# Patient Record
Sex: MALE | Race: WHITE | URBAN - METROPOLITAN AREA
[De-identification: names, ages, dates, MRNs, and addresses within clinical notes are randomized per-mention and may not be internally consistent; named-entity substitution may affect disease eponyms.]

---

## 2021-02-15 ENCOUNTER — OFFICE VISIT (OUTPATIENT)
Dept: URBAN - METROPOLITAN AREA CLINIC 17 | Facility: CLINIC | Age: 51
End: 2021-02-15
Payer: COMMERCIAL

## 2021-02-15 PROCEDURE — 99204 OFFICE O/P NEW MOD 45 MIN: CPT | Performed by: OPHTHALMOLOGY

## 2021-02-15 ASSESSMENT — KERATOMETRY
OS: 45.13
OD: 43.50

## 2021-02-15 ASSESSMENT — INTRAOCULAR PRESSURE
OD: 18
OS: 16

## 2021-02-15 ASSESSMENT — VISUAL ACUITY
OS: 20/100
OD: 20/60

## 2021-02-15 NOTE — IMPRESSION/PLAN
Impression: Type 2 diab w moderate nonprlf diab rtnop w/o macular edema, bilateral: A76.1898. Plan: Diabetes type II: moderate background diabetic retinopathy, no signs of neovascularization noted. Will refer patient for a retinal consult to determine if treatment is necessary. Discussed ocular and systemic benefits of maintaining blood sugar control.

## 2021-02-15 NOTE — IMPRESSION/PLAN
Impression: Combined forms of age-related cataract, bilateral: H25.813. Condition: established, worsening. Symptoms: could improve with surgery. Plan: Cataract accounts for patient's complaints. Reviewed risks, benefits, and procedure. Patient desires surgery, schedule ce/iol OD then OS, RL2, standard lens, distance refractive target, patient is clear for surgery in [place] [id number]. Pt to use NSAID and Steroids (Or Combo drops) for 6-8 weeks after surgery. Advised pt visual improvement may be limited due to retinopathy. Pt aware and would like to proceed.

## 2021-02-18 ENCOUNTER — OFFICE VISIT (OUTPATIENT)
Dept: URBAN - METROPOLITAN AREA CLINIC 33 | Facility: CLINIC | Age: 51
End: 2021-02-18
Payer: COMMERCIAL

## 2021-02-18 DIAGNOSIS — E11.3393 TYPE 2 DIAB W MODERATE NONPRLF DIAB RTNOP W/O MACULAR EDEMA, BILATERAL: Primary | ICD-10-CM

## 2021-02-18 DIAGNOSIS — H25.813 COMBINED FORMS OF AGE-RELATED CATARACT, BILATERAL: ICD-10-CM

## 2021-02-18 PROCEDURE — 99214 OFFICE O/P EST MOD 30 MIN: CPT | Performed by: OPHTHALMOLOGY

## 2021-02-18 PROCEDURE — 92134 CPTRZ OPH DX IMG PST SGM RTA: CPT | Performed by: OPHTHALMOLOGY

## 2021-02-18 ASSESSMENT — INTRAOCULAR PRESSURE
OS: 17
OD: 18

## 2021-02-18 NOTE — IMPRESSION/PLAN
Impression: Type 2 diab w moderate nonprlf diab rtnop w/o macular edema, bilateral: Z95.0475. Bilateral. Condition: stable. Vision: vision not affected. no prior treatment Plan: Discussed diagnosis in detail with patient. Exam shows  scattered hemorrhage, no neovascularization, no macular edema OU, slightly hazy view due to dense cataracts. No treatment is recommended at this time. Emphasized blood sugar control and advised to keep future appointments with PCP and/or Endocrinologist for the management of Diabetes. Recommend observation for now. OCT shows stable no active macular edema OU.

## 2021-02-18 NOTE — IMPRESSION/PLAN
Impression: Combined forms of age-related cataract, bilateral: H25.813. Bilateral. Condition: worsening. Vision: vision affected. Plan: Advised patient of condition. Discussed diagnosis in detail with patient. Discussed with patient he is okay to proceed with cataract surgery OU, with Dr. Kay Engel.

## 2021-02-19 ENCOUNTER — PRE-OPERATIVE VISIT (OUTPATIENT)
Dept: URBAN - METROPOLITAN AREA CLINIC 17 | Facility: CLINIC | Age: 51
End: 2021-02-19
Payer: COMMERCIAL

## 2021-02-19 RX ORDER — OFLOXACIN 3 MG/ML
0.3 % SOLUTION/ DROPS OPHTHALMIC
Qty: 5 | Refills: 1 | Status: INACTIVE
Start: 2021-02-19 | End: 2021-04-14

## 2021-02-19 RX ORDER — KETOROLAC TROMETHAMINE 5 MG/ML
0.5 % SOLUTION OPHTHALMIC
Qty: 5 | Refills: 1 | Status: INACTIVE
Start: 2021-02-19 | End: 2021-04-14

## 2021-02-19 RX ORDER — PREDNISOLONE ACETATE 10 MG/ML
1 % SUSPENSION/ DROPS OPHTHALMIC
Qty: 10 | Refills: 1 | Status: INACTIVE
Start: 2021-02-19 | End: 2021-04-14

## 2021-02-19 ASSESSMENT — PACHYMETRY
OD: 3.50
OS: 23.57
OD: 23.71
OS: 3.45

## 2021-02-25 ENCOUNTER — SURGERY (OUTPATIENT)
Dept: URBAN - METROPOLITAN AREA SURGERY 7 | Facility: SURGERY | Age: 51
End: 2021-02-25
Payer: COMMERCIAL

## 2021-02-25 PROCEDURE — 66984 XCAPSL CTRC RMVL W/O ECP: CPT | Performed by: OPHTHALMOLOGY

## 2021-02-26 ENCOUNTER — POST-OPERATIVE VISIT (OUTPATIENT)
Dept: URBAN - METROPOLITAN AREA CLINIC 17 | Facility: CLINIC | Age: 51
End: 2021-02-26
Payer: COMMERCIAL

## 2021-02-26 PROCEDURE — 99024 POSTOP FOLLOW-UP VISIT: CPT | Performed by: OPTOMETRIST

## 2021-02-26 ASSESSMENT — INTRAOCULAR PRESSURE
OS: 15
OD: 20

## 2021-02-26 NOTE — IMPRESSION/PLAN
Impression: S/P Cataract Extraction by phacoemulsification with IOL placement [id number] OD - 1 Day. Encounter for surgical aftercare following surgery on a sense organ  Z48.810. Post operative instructions reviewed - Plan: RTC in 1 week for PO2 OD:
--Start Ketorolac QID x 8 weeks
----Continue Ofloxacin 0.3% QID x 1 week then d/c
--Taper Prednisolone acetate 1% QID x 5 wk, TID x 1 wk, BID x 1wk, QD x 1wk, then d/c

## 2021-03-05 ENCOUNTER — POST-OPERATIVE VISIT (OUTPATIENT)
Dept: URBAN - METROPOLITAN AREA CLINIC 17 | Facility: CLINIC | Age: 51
End: 2021-03-05
Payer: COMMERCIAL

## 2021-03-05 PROCEDURE — 99024 POSTOP FOLLOW-UP VISIT: CPT | Performed by: OPTOMETRIST

## 2021-03-05 ASSESSMENT — INTRAOCULAR PRESSURE
OS: 14
OD: 16

## 2021-03-05 NOTE — IMPRESSION/PLAN
Impression: S/P CE/Standard IOL SA60WF+ 19.50 OD - 8 Days. Encounter for surgical aftercare following surgery on a sense organ  Z48.810. Plan: --Finish Ofloxacin 0.3%--Taper Prednisolone acetate 1% QID x 1 wk, TID x 1 wk, BID x 1wk, QD x 1wk, then d/c. Cont.  Ketorolac QID

## 2021-03-15 ENCOUNTER — SURGERY (OUTPATIENT)
Dept: URBAN - METROPOLITAN AREA SURGERY 7 | Facility: SURGERY | Age: 51
End: 2021-03-15
Payer: COMMERCIAL

## 2021-03-15 DIAGNOSIS — H25.812 COMBINED FORMS OF AGE-RELATED CATARACT, LEFT EYE: Primary | ICD-10-CM

## 2021-03-15 PROCEDURE — 66984 XCAPSL CTRC RMVL W/O ECP: CPT | Performed by: OPHTHALMOLOGY

## 2021-03-16 ENCOUNTER — POST-OPERATIVE VISIT (OUTPATIENT)
Dept: URBAN - METROPOLITAN AREA CLINIC 17 | Facility: CLINIC | Age: 51
End: 2021-03-16
Payer: COMMERCIAL

## 2021-03-16 PROCEDURE — 99024 POSTOP FOLLOW-UP VISIT: CPT | Performed by: OPTOMETRIST

## 2021-03-16 ASSESSMENT — INTRAOCULAR PRESSURE
OS: 17
OD: 15

## 2021-03-16 NOTE — IMPRESSION/PLAN
Impression: S/P CE/Standard IOL (SA60WF, +19.50)  - 1 Day. Presence of intraocular lens  Z96.1. Post operative instructions reviewed - Plan: RTC in 1 week for PO2 OS:
--Continue Ofloxacin 0.3% QID x 1 week then d/c
--Continue Ketorolac QID x 8 weeks --Taper Prednisolone acetate 1% QID x 5 wk, TID x 1 wk, BID x 1wk, QD x 1wk, then d/c
OD:
--Taper Prednisolone acetate 1% QID x 3 wk, TID x 1 wk, BID x 1wk, QD x 1wk, then d/c
--Continue Ketorolac QID x 6 weeks

## 2021-03-22 ENCOUNTER — POST-OPERATIVE VISIT (OUTPATIENT)
Dept: URBAN - METROPOLITAN AREA CLINIC 17 | Facility: CLINIC | Age: 51
End: 2021-03-22
Payer: COMMERCIAL

## 2021-03-22 DIAGNOSIS — Z96.1 PRESENCE OF INTRAOCULAR LENS: Primary | ICD-10-CM

## 2021-03-22 PROCEDURE — 99024 POSTOP FOLLOW-UP VISIT: CPT | Performed by: OPHTHALMOLOGY

## 2021-03-22 ASSESSMENT — INTRAOCULAR PRESSURE
OD: 18
OS: 16

## 2021-03-22 ASSESSMENT — VISUAL ACUITY
OS: 20/30
OD: 20/25

## 2021-03-22 NOTE — IMPRESSION/PLAN
Impression: S/P CE/Standard IOL (SA60WF, +19.50) OS - 7 Days. Presence of intraocular lens  Z96.1. Plan: Advised pt he will not notices much improvement until he gets his first pair of eye glasses due to astigmatism.  --Continue Prednisolone acetate 1% QID OS and Ketorolac QID OS--Discontinue Ofloxacin 0.3%

## 2021-04-14 ENCOUNTER — OFFICE VISIT (OUTPATIENT)
Dept: URBAN - METROPOLITAN AREA CLINIC 17 | Facility: CLINIC | Age: 51
End: 2021-04-14
Payer: COMMERCIAL

## 2021-04-14 DIAGNOSIS — H26.493 OTHER SECONDARY CATARACT, BILATERAL: ICD-10-CM

## 2021-04-14 PROCEDURE — 99214 OFFICE O/P EST MOD 30 MIN: CPT | Performed by: OPHTHALMOLOGY

## 2021-04-14 PROCEDURE — 92134 CPTRZ OPH DX IMG PST SGM RTA: CPT | Performed by: OPHTHALMOLOGY

## 2021-04-14 ASSESSMENT — INTRAOCULAR PRESSURE
OS: 13
OD: 20

## 2021-04-14 NOTE — IMPRESSION/PLAN
Impression: Type 2 diab w moderate nonprlf diab rtnop w/o macular edema, bilateral: V32.2830. Bilateral. Condition: stable. Vision: vision not affected. no prior treatment Plan: Discussed diagnosis in detail with patient. Exam shows minimal diabetic changes OU. No treatment is recommended at this time. Emphasized blood sugar control and advised to keep future appointments with PCP and/or Endocrinologist for the management of Diabetes. Recommend observation for now. OCT is wnl OU and Optos shows ma's OD and stable OS. Recommend a retina follow - up in 9 - 12 mos.

## 2021-04-14 NOTE — IMPRESSION/PLAN
Impression: Other secondary cataract, bilateral: H26.493. Bilateral. Condition: worsening. Vision: vision affected. Plan: Discussed diagnosis in detail with patient. Recommend Yag Laser treatment Right eye and Left eye for vision improvement. Discussed risks/benefits of laser [place]. All questions answered. Patient elects to proceed with recommendation. RL1.

## 2021-04-20 ENCOUNTER — POST-OPERATIVE VISIT (OUTPATIENT)
Dept: URBAN - METROPOLITAN AREA CLINIC 17 | Facility: CLINIC | Age: 51
End: 2021-04-20
Payer: COMMERCIAL

## 2021-04-20 PROCEDURE — 99024 POSTOP FOLLOW-UP VISIT: CPT | Performed by: OPTOMETRIST

## 2021-04-20 ASSESSMENT — VISUAL ACUITY
OD: 20/25-1
OS: 20/25

## 2021-04-20 ASSESSMENT — INTRAOCULAR PRESSURE
OS: 11
OD: 13

## 2021-04-20 NOTE — IMPRESSION/PLAN
Impression: S/P CE/Standard IOL (SA60WF, +19.50) OS - 36 Days. Presence of intraocular lens  Z96.1.  Plan: Keep scheduled appt for YAG OU 
--Continue Prednisolone acetate 1% QID OS
--Continue Ketorolac 0.5% QID OS

## 2021-04-26 ENCOUNTER — SURGERY (OUTPATIENT)
Dept: URBAN - METROPOLITAN AREA SURGERY 7 | Facility: SURGERY | Age: 51
End: 2021-04-26
Payer: COMMERCIAL

## 2021-04-26 PROCEDURE — 66821 AFTER CATARACT LASER SURGERY: CPT | Performed by: OPHTHALMOLOGY

## 2021-05-03 ENCOUNTER — POST-OPERATIVE VISIT (OUTPATIENT)
Dept: URBAN - METROPOLITAN AREA CLINIC 17 | Facility: CLINIC | Age: 51
End: 2021-05-03
Payer: COMMERCIAL

## 2021-05-03 DIAGNOSIS — Z48.810 ENCOUNTER FOR SURGICAL AFTERCARE FOLLOWING SURGERY ON A SENSE ORGAN: Primary | ICD-10-CM

## 2021-05-03 PROCEDURE — 99024 POSTOP FOLLOW-UP VISIT: CPT | Performed by: OPTOMETRIST

## 2021-05-03 ASSESSMENT — INTRAOCULAR PRESSURE
OD: 13
OS: 10

## 2021-05-03 ASSESSMENT — VISUAL ACUITY: OD: 20/20

## 2021-05-03 NOTE — IMPRESSION/PLAN
Impression: S/P YAG Capsulotomy (Yttrium Aluminum [place]) OD - 7 Days. Encounter for surgical aftercare following surgery on a sense organ  Z48.810.  Plan: proceed with YAG OS as scheduled CONTINUE PRED/KETOROLAC BID OU

## 2021-05-18 ENCOUNTER — SURGERY (OUTPATIENT)
Dept: URBAN - METROPOLITAN AREA SURGERY 7 | Facility: SURGERY | Age: 51
End: 2021-05-18
Payer: COMMERCIAL

## 2021-05-18 DIAGNOSIS — H26.492 OTHER SECONDARY CATARACT, LEFT EYE: Primary | ICD-10-CM

## 2021-05-18 PROCEDURE — 66821 AFTER CATARACT LASER SURGERY: CPT | Performed by: OPHTHALMOLOGY

## 2021-05-24 ENCOUNTER — POST-OPERATIVE VISIT (OUTPATIENT)
Dept: URBAN - METROPOLITAN AREA CLINIC 17 | Facility: CLINIC | Age: 51
End: 2021-05-24
Payer: COMMERCIAL

## 2021-05-24 PROCEDURE — 99024 POSTOP FOLLOW-UP VISIT: CPT | Performed by: OPTOMETRIST

## 2021-05-24 ASSESSMENT — INTRAOCULAR PRESSURE
OD: 17
OS: 15

## 2021-05-24 NOTE — IMPRESSION/PLAN
Impression: S/P YAG Capsulotomy (Yttrium Aluminum [place]) OS - 6 Days. Encounter for surgical aftercare following surgery on a sense organ  Z48.810. Plan: 9-10 months with Dr Magalis Lang No Gtts--Advised patient to use artificial tears for comfort.

## 2022-12-21 ENCOUNTER — OFFICE VISIT (OUTPATIENT)
Dept: URBAN - METROPOLITAN AREA CLINIC 17 | Facility: CLINIC | Age: 52
End: 2022-12-21
Payer: COMMERCIAL

## 2022-12-21 DIAGNOSIS — E11.3312 TYPE 2 DIAB W MODERATE NONPRLF DIAB RTNOP W MACULAR EDEMA, LEFT EYE: Primary | ICD-10-CM

## 2022-12-21 DIAGNOSIS — E11.3291 TYPE 2 DIAB W MILD NONPRLF DIABETIC RTNOP W/O MACULAR EDEMA, RIGHT EYE: ICD-10-CM

## 2022-12-21 PROCEDURE — 92134 CPTRZ OPH DX IMG PST SGM RTA: CPT | Performed by: OPHTHALMOLOGY

## 2022-12-21 PROCEDURE — 99213 OFFICE O/P EST LOW 20 MIN: CPT | Performed by: OPHTHALMOLOGY

## 2022-12-21 RX ORDER — KETOROLAC TROMETHAMINE 5 MG/ML
0.5 % SOLUTION OPHTHALMIC
Qty: 10 | Refills: 5 | Status: ACTIVE
Start: 2022-12-21

## 2022-12-21 ASSESSMENT — INTRAOCULAR PRESSURE
OD: 18
OS: 18

## 2022-12-21 NOTE — IMPRESSION/PLAN
Impression: Type 2 diab w moderate nonprlf diab rtnop w macular edema, left eye: N20.8260 Left. Condition: established, worsening. Vision: vision affected. Plan: Discussed diagnosis in detail with patient. OCT OS shows active edema. Exam and Optos OS shows aneurysms. Recommend for patient to start Ketorolac QID OS and will reassess in 6-8 weeks. If no improvement with Drops, will consider an injection to further help reduce the edema.

## 2022-12-21 NOTE — IMPRESSION/PLAN
Impression: Type 2 diab w mild nonprlf diabetic rtnop w/o macular edema, right eye: e11.3291. Right. Condition: established, stable. Plan: Discussed diagnosis in detail with patient. Exam shows minimal Diabetic changes. No treatment is recommended at this time. Emphasized blood sugar control and advised to keep future appointments with PCP and/or Endocrinologist for the management of Diabetes. Recommend observation for now. OCT and Optos shows stable appearing.

## 2024-02-06 ENCOUNTER — OFFICE VISIT (OUTPATIENT)
Dept: URBAN - METROPOLITAN AREA CLINIC 18 | Facility: CLINIC | Age: 54
End: 2024-02-06
Payer: COMMERCIAL

## 2024-02-06 DIAGNOSIS — H16.223 KERATOCONJUNCTIVITIS SICCA, NOT SPECIFIED AS SJ”GREN'S, BILATERAL: ICD-10-CM

## 2024-02-06 DIAGNOSIS — E11.3313 TYPE 2 DIABETES MELLITUS W/ MODERATE NONPROLIFERATIVE DIABETIC RETINOPATHY W/ MACULAR EDEMA OF BILATERAL EYES: Primary | ICD-10-CM

## 2024-02-06 DIAGNOSIS — E11.3312 TYPE 2 DIABETES MELLITUS WITH MODERATE NONPROLIFERATIVE DIABETIC RETINOPATHY WITH MACULAR EDEMA, LEFT EYE: ICD-10-CM

## 2024-02-06 PROCEDURE — 92134 CPTRZ OPH DX IMG PST SGM RTA: CPT

## 2024-02-06 PROCEDURE — 99214 OFFICE O/P EST MOD 30 MIN: CPT

## 2024-02-06 ASSESSMENT — INTRAOCULAR PRESSURE
OD: 15
OS: 17

## 2024-03-13 ENCOUNTER — OFFICE VISIT (OUTPATIENT)
Dept: URBAN - METROPOLITAN AREA CLINIC 17 | Facility: CLINIC | Age: 54
End: 2024-03-13
Payer: COMMERCIAL

## 2024-03-13 DIAGNOSIS — E11.3391 TYPE 2 DIAB W MODERATE NONPRLF DIAB RTNOP W/O MACULAR EDEMA, RIGHT EYE: ICD-10-CM

## 2024-03-13 DIAGNOSIS — E11.3312 TYPE 2 DIAB W MODERATE NONPRLF DIAB RTNOP W MACULAR EDEMA, LEFT EYE: Primary | ICD-10-CM

## 2024-03-13 PROCEDURE — 99213 OFFICE O/P EST LOW 20 MIN: CPT | Performed by: SPECIALIST

## 2024-03-13 PROCEDURE — 92134 CPTRZ OPH DX IMG PST SGM RTA: CPT | Performed by: SPECIALIST

## 2024-03-13 ASSESSMENT — INTRAOCULAR PRESSURE
OS: 17
OD: 17

## 2024-04-04 ENCOUNTER — OFFICE VISIT (OUTPATIENT)
Dept: URBAN - METROPOLITAN AREA CLINIC 17 | Facility: CLINIC | Age: 54
End: 2024-04-04
Payer: COMMERCIAL

## 2024-04-04 DIAGNOSIS — E11.3312 TYPE 2 DIABETES MELLITUS WITH MODERATE NONPROLIFERATIVE DIABETIC RETINOPATHY WITH MACULAR EDEMA, LEFT EYE: Primary | ICD-10-CM

## 2024-04-04 PROCEDURE — 67028 INJECTION EYE DRUG: CPT | Performed by: SPECIALIST

## 2024-04-04 PROCEDURE — 92134 CPTRZ OPH DX IMG PST SGM RTA: CPT | Performed by: SPECIALIST

## 2024-05-02 ENCOUNTER — OFFICE VISIT (OUTPATIENT)
Dept: URBAN - METROPOLITAN AREA CLINIC 17 | Facility: CLINIC | Age: 54
End: 2024-05-02
Payer: COMMERCIAL

## 2024-05-02 DIAGNOSIS — E11.3312 TYPE 2 DIABETES MELLITUS WITH MODERATE NONPROLIFERATIVE DIABETIC RETINOPATHY WITH MACULAR EDEMA, LEFT EYE: Primary | ICD-10-CM

## 2024-05-02 PROCEDURE — 67028 INJECTION EYE DRUG: CPT | Performed by: SPECIALIST

## 2024-05-02 PROCEDURE — 92134 CPTRZ OPH DX IMG PST SGM RTA: CPT | Performed by: SPECIALIST

## 2024-08-30 ENCOUNTER — OFFICE VISIT (OUTPATIENT)
Dept: URBAN - METROPOLITAN AREA CLINIC 17 | Facility: CLINIC | Age: 54
End: 2024-08-30
Payer: COMMERCIAL

## 2024-08-30 DIAGNOSIS — E11.3213 TYPE 2 DIAB W MILD NONPRLF DIABETIC RTNOP W MACULAR EDEMA, BILATERAL: Primary | ICD-10-CM

## 2024-08-30 PROCEDURE — 92134 CPTRZ OPH DX IMG PST SGM RTA: CPT | Performed by: OPHTHALMOLOGY

## 2024-08-30 PROCEDURE — 92014 COMPRE OPH EXAM EST PT 1/>: CPT | Performed by: OPHTHALMOLOGY

## 2024-08-30 ASSESSMENT — INTRAOCULAR PRESSURE
OD: 17
OS: 17

## 2024-10-25 ENCOUNTER — OFFICE VISIT (OUTPATIENT)
Dept: URBAN - METROPOLITAN AREA CLINIC 17 | Facility: CLINIC | Age: 54
End: 2024-10-25
Payer: COMMERCIAL

## 2024-10-25 DIAGNOSIS — E11.3213 TYPE 2 DIAB W MILD NONPRLF DIABETIC RTNOP W MACULAR EDEMA, BILATERAL: Primary | ICD-10-CM

## 2024-10-25 PROCEDURE — 92134 CPTRZ OPH DX IMG PST SGM RTA: CPT | Performed by: OPHTHALMOLOGY

## 2024-10-25 PROCEDURE — 92014 COMPRE OPH EXAM EST PT 1/>: CPT | Performed by: OPHTHALMOLOGY

## 2024-10-25 ASSESSMENT — INTRAOCULAR PRESSURE
OD: 17
OS: 17